# Patient Record
Sex: MALE | Race: WHITE | URBAN - METROPOLITAN AREA
[De-identification: names, ages, dates, MRNs, and addresses within clinical notes are randomized per-mention and may not be internally consistent; named-entity substitution may affect disease eponyms.]

---

## 2023-07-18 ENCOUNTER — OFFICE VISIT (OUTPATIENT)
Dept: URBAN - METROPOLITAN AREA CLINIC 56 | Facility: LOCATION | Age: 48
End: 2023-07-18
Payer: COMMERCIAL

## 2023-07-18 DIAGNOSIS — H01.004 UNSPECIFIED BLEPHARITIS OF LEFT UPPER EYELID: ICD-10-CM

## 2023-07-18 DIAGNOSIS — H00.012 HORDEOLUM, RIGHT LOWER LID: Primary | ICD-10-CM

## 2023-07-18 DIAGNOSIS — H01.005 UNSPECIFIED BLEPHARITIS OF LEFT LOWER EYELID: ICD-10-CM

## 2023-07-18 DIAGNOSIS — H01.001 UNSPECIFIED BLEPARITIS OF RIGHT UPPER EYELID: ICD-10-CM

## 2023-07-18 DIAGNOSIS — H00.015 HORDEOLUM, LEFT LOWER LID: ICD-10-CM

## 2023-07-18 DIAGNOSIS — H01.002 UNSPECIFIED BLEPHARITIS OF RIGHT LOWER EYELID: ICD-10-CM

## 2023-07-18 PROCEDURE — 99204 OFFICE O/P NEW MOD 45 MIN: CPT | Performed by: OPTOMETRIST

## 2023-07-18 RX ORDER — DOXYCYCLINE HYCLATE 100 MG/1
100 MG CAPSULE, GELATIN COATED ORAL
Qty: 28 | Refills: 1 | Status: ACTIVE
Start: 2023-07-18

## 2023-07-18 ASSESSMENT — INTRAOCULAR PRESSURE
OS: 17
OD: 17

## 2023-07-18 ASSESSMENT — KERATOMETRY
OS: 42.95
OD: 42.98

## 2023-07-18 NOTE — IMPRESSION/PLAN
Impression: Hordeolum, right lower lid: H00.012. Plan: Recommend Doxycycline 100MG BID for 2 weeks, 1 refill given Start warm compress frequently Start Hypochlor lid spray If not resolved, return to Dr. Galan Dearborn for removal.